# Patient Record
Sex: MALE | Race: WHITE
[De-identification: names, ages, dates, MRNs, and addresses within clinical notes are randomized per-mention and may not be internally consistent; named-entity substitution may affect disease eponyms.]

---

## 2019-12-07 ENCOUNTER — HOSPITAL ENCOUNTER (EMERGENCY)
Dept: HOSPITAL 53 - M ED | Age: 24
Discharge: HOME | End: 2019-12-07
Payer: COMMERCIAL

## 2019-12-07 VITALS — HEIGHT: 67 IN | BODY MASS INDEX: 24.38 KG/M2 | WEIGHT: 155.32 LBS

## 2019-12-07 VITALS — SYSTOLIC BLOOD PRESSURE: 116 MMHG | DIASTOLIC BLOOD PRESSURE: 59 MMHG

## 2019-12-07 DIAGNOSIS — R51: ICD-10-CM

## 2019-12-07 DIAGNOSIS — J10.1: Primary | ICD-10-CM

## 2019-12-07 DIAGNOSIS — R94.31: ICD-10-CM

## 2019-12-07 LAB
ALBUMIN SERPL BCG-MCNC: 4.4 GM/DL (ref 3.2–5.2)
ALT SERPL W P-5'-P-CCNC: 38 U/L (ref 12–78)
APAP SERPL-MCNC: 4.1 UG/ML (ref 10–30)
BASOPHILS # BLD AUTO: 0.1 10^3/UL (ref 0–0.2)
BASOPHILS NFR BLD AUTO: 1.1 % (ref 0–1)
BILIRUB CONJ SERPL-MCNC: 0.2 MG/DL (ref 0–0.2)
BILIRUB SERPL-MCNC: 0.7 MG/DL (ref 0.2–1)
BUN SERPL-MCNC: 10 MG/DL (ref 7–18)
CALCIUM SERPL-MCNC: 9.5 MG/DL (ref 8.5–10.1)
CHLORIDE SERPL-SCNC: 104 MEQ/L (ref 98–107)
CK MB CFR.DF SERPL CALC: 0.34
CK MB SERPL-MCNC: < 1 NG/ML (ref ?–3.6)
CK SERPL-CCNC: 294 U/L (ref 39–308)
CO2 SERPL-SCNC: 25 MEQ/L (ref 21–32)
CREAT SERPL-MCNC: 1.31 MG/DL (ref 0.7–1.3)
EOSINOPHIL # BLD AUTO: 0 10^3/UL (ref 0–0.5)
EOSINOPHIL NFR BLD AUTO: 0.2 % (ref 0–3)
ETHANOL SERPL-MCNC: < 0.003 % (ref 0–0.01)
FLUAV RNA UPPER RESP QL NAA+PROBE: NEGATIVE
FLUBV RNA UPPER RESP QL NAA+PROBE: POSITIVE
GFR SERPL CREATININE-BSD FRML MDRD: > 60 ML/MIN/{1.73_M2} (ref 60–?)
GLUCOSE SERPL-MCNC: 112 MG/DL (ref 70–100)
HCT VFR BLD AUTO: 46.7 % (ref 42–52)
HGB BLD-MCNC: 15.8 G/DL (ref 13.5–17.5)
LYMPHOCYTES # BLD AUTO: 0.4 10^3/UL (ref 1.5–5)
LYMPHOCYTES NFR BLD AUTO: 8.5 % (ref 24–44)
MCH RBC QN AUTO: 29.2 PG (ref 27–33)
MCHC RBC AUTO-ENTMCNC: 33.8 G/DL (ref 32–36.5)
MCV RBC AUTO: 86.2 FL (ref 80–96)
MONOCYTES # BLD AUTO: 0.7 10^3/UL (ref 0–0.8)
MONOCYTES NFR BLD AUTO: 14.8 % (ref 0–5)
NEUTROPHILS # BLD AUTO: 3.4 10^3/UL (ref 1.5–8.5)
NEUTROPHILS NFR BLD AUTO: 75 % (ref 36–66)
PLATELET # BLD AUTO: 190 10^3/UL (ref 150–450)
POTASSIUM SERPL-SCNC: 3.5 MEQ/L (ref 3.5–5.1)
PROT SERPL-MCNC: 7.6 GM/DL (ref 6.4–8.2)
RBC # BLD AUTO: 5.42 10^6/UL (ref 4.3–6.1)
SALICYLATES SERPL-MCNC: < 1.7 MG/DL (ref 5–30)
SODIUM SERPL-SCNC: 139 MEQ/L (ref 136–145)
TROPONIN I SERPL-MCNC: < 0.02 NG/ML (ref ?–0.1)
TSH SERPL DL<=0.005 MIU/L-ACNC: 1.1 UIU/ML (ref 0.36–3.74)
WBC # BLD AUTO: 4.5 10^3/UL (ref 4–10)

## 2019-12-07 PROCEDURE — 82553 CREATINE MB FRACTION: CPT

## 2019-12-07 PROCEDURE — 99284 EMERGENCY DEPT VISIT MOD MDM: CPT

## 2019-12-07 PROCEDURE — 71046 X-RAY EXAM CHEST 2 VIEWS: CPT

## 2019-12-07 PROCEDURE — 87502 INFLUENZA DNA AMP PROBE: CPT

## 2019-12-07 PROCEDURE — 82550 ASSAY OF CK (CPK): CPT

## 2019-12-07 PROCEDURE — 96374 THER/PROPH/DIAG INJ IV PUSH: CPT

## 2019-12-07 PROCEDURE — 70450 CT HEAD/BRAIN W/O DYE: CPT

## 2019-12-07 PROCEDURE — 93005 ELECTROCARDIOGRAM TRACING: CPT

## 2019-12-07 PROCEDURE — 84484 ASSAY OF TROPONIN QUANT: CPT

## 2019-12-07 PROCEDURE — 94760 N-INVAS EAR/PLS OXIMETRY 1: CPT

## 2019-12-07 PROCEDURE — 84443 ASSAY THYROID STIM HORMONE: CPT

## 2019-12-07 PROCEDURE — 87040 BLOOD CULTURE FOR BACTERIA: CPT

## 2019-12-07 PROCEDURE — 85025 COMPLETE CBC W/AUTO DIFF WBC: CPT

## 2019-12-07 PROCEDURE — 80048 BASIC METABOLIC PNL TOTAL CA: CPT

## 2019-12-07 PROCEDURE — 80076 HEPATIC FUNCTION PANEL: CPT

## 2019-12-07 PROCEDURE — 96361 HYDRATE IV INFUSION ADD-ON: CPT

## 2019-12-07 NOTE — ECGEPIP
[facility] - ED

                                       

                                       Test Date:    2019

Pat Name:     SUMANTH MCCAIN             Department:   

Patient ID:   [id number]                 Room:         -

Gender:       Male                     Technician:   ab

:          1995               Requested By: NEMESIO AVILES

Order Number: [id number]     Reading MD:   Manisha Gongora

                                 Measurements

Intervals                              Axis          

Rate:         80                       P:            71

IL:           140                      QRS:          58

QRSD:         102                      T:            7

QT:           364                                    

QTc:          422                                    

                           Interpretive Statements

SINUS RHYTHM 

POSSIBLE LEFT ATRIAL ENLARGEMENT

ABNORMAL RHYTHM ECG

NSTTW abnormalities

No prior

Electronically Signed on 2019 18:29:02 EST by Manisha Gongora

## 2019-12-08 NOTE — REP
CT BRAIN WITHOUT CONTRAST:  12/07/2019.

 

CLINICAL HISTORY:  Altered mental status.

 

TECHNIQUE:  Standard noncontrast soft tissue and bone windows are reviewed for

each slice level.

 

FINDINGS:  No prior study.  Lateral ventricles midline, symmetric, and without

dilatation or displacement.  Third and fourth ventricles also unremarkable.

Basal ganglia symmetric and normal.  The white matter tracts are intact.  The

gray-white junction is well maintained.  There is no intra- or extra-axial

hemorrhage, mass, mass effect, or edema.  Basal cisterns are intact.  The

cortical stripe is preserved.  No extra-axial fluid collection.

 

Posterior fossa shows brainstem and cerebellum intact.  Basal cisterns intact.

Mastoids and visualized sinuses are clear.  The skull base and calvarium are

without fracture or focal lesion.  There is a 5.5 mm hyperdense nodule in the

scalp, the left frontal region superiorly.  This could be a sebaceous cyst with

inspissated secretions or other similar skin appendage, doubt foreign body, does

not have calcification on the bone window settings.  Calvarium and skull base are

without a focal lesion.

 

IMPRESSION:

 

1.  No intracranial hemorrhage, edema, mass, infarct, or other acute intracranial

process.

 

2.  Skull base and calvarium without fracture or focal lesion.  Sinuses and

visualized mastoids clear.

 

 

Electronically Signed by

Austen Springer MD 12/08/2019 09:12 A

## 2019-12-08 NOTE — REP
CHEST PA AND LATERAL:  12/07/2019.

 

CLINICAL HISTORY:  Altered mental status.

 

FINDINGS:  Two views of the chest were provided.  No prior study.  The lungs are

well inflated and clear.  Heart, mediastinal and hilar contours are normal.

Aorta, airway grossly intact.  Bones are unremarkable.  No free air under the

diaphragm.

 

IMPRESSION:

 

1.  No acute cardiopulmonary change.

 

 

Electronically Signed by

Austen Springer MD 12/08/2019 09:13 A